# Patient Record
Sex: MALE | Race: ASIAN | NOT HISPANIC OR LATINO | Employment: FULL TIME | URBAN - NONMETROPOLITAN AREA
[De-identification: names, ages, dates, MRNs, and addresses within clinical notes are randomized per-mention and may not be internally consistent; named-entity substitution may affect disease eponyms.]

---

## 2024-05-08 ENCOUNTER — OFFICE VISIT (OUTPATIENT)
Dept: FAMILY MEDICINE CLINIC | Facility: CLINIC | Age: 44
End: 2024-05-08
Payer: COMMERCIAL

## 2024-05-08 VITALS
HEART RATE: 102 BPM | WEIGHT: 150.4 LBS | BODY MASS INDEX: 22.28 KG/M2 | OXYGEN SATURATION: 98 % | HEIGHT: 69 IN | SYSTOLIC BLOOD PRESSURE: 116 MMHG | DIASTOLIC BLOOD PRESSURE: 84 MMHG

## 2024-05-08 DIAGNOSIS — Z13.220 LIPID SCREENING: ICD-10-CM

## 2024-05-08 DIAGNOSIS — K59.00 CONSTIPATION, UNSPECIFIED CONSTIPATION TYPE: ICD-10-CM

## 2024-05-08 DIAGNOSIS — Z13.29 THYROID DISORDER SCREEN: ICD-10-CM

## 2024-05-08 DIAGNOSIS — Z00.00 ANNUAL PHYSICAL EXAM: Primary | ICD-10-CM

## 2024-05-08 DIAGNOSIS — Z13.1 SCREENING FOR DIABETES MELLITUS: ICD-10-CM

## 2024-05-08 PROCEDURE — 99386 PREV VISIT NEW AGE 40-64: CPT | Performed by: FAMILY MEDICINE

## 2024-05-08 RX ORDER — DIPHENOXYLATE HYDROCHLORIDE AND ATROPINE SULFATE 2.5; .025 MG/1; MG/1
1 TABLET ORAL DAILY
COMMUNITY

## 2024-05-10 ENCOUNTER — LAB (OUTPATIENT)
Dept: FAMILY MEDICINE CLINIC | Facility: CLINIC | Age: 44
End: 2024-05-10
Payer: COMMERCIAL

## 2024-05-10 DIAGNOSIS — K59.00 CONSTIPATION, UNSPECIFIED CONSTIPATION TYPE: ICD-10-CM

## 2024-05-10 DIAGNOSIS — Z00.00 ANNUAL PHYSICAL EXAM: Primary | ICD-10-CM

## 2024-05-10 DIAGNOSIS — Z13.1 SCREENING FOR DIABETES MELLITUS: ICD-10-CM

## 2024-05-10 DIAGNOSIS — Z13.29 THYROID DISORDER SCREEN: ICD-10-CM

## 2024-05-10 DIAGNOSIS — Z13.220 LIPID SCREENING: ICD-10-CM

## 2024-05-10 PROCEDURE — 36415 COLL VENOUS BLD VENIPUNCTURE: CPT | Performed by: FAMILY MEDICINE

## 2024-05-11 LAB
ALBUMIN SERPL-MCNC: 4.9 G/DL (ref 4.1–5.1)
ALBUMIN/GLOB SERPL: 1.8 {RATIO} (ref 1.2–2.2)
ALP SERPL-CCNC: 82 IU/L (ref 44–121)
ALT SERPL-CCNC: 35 IU/L (ref 0–44)
AST SERPL-CCNC: 23 IU/L (ref 0–40)
BASOPHILS # BLD AUTO: 0.1 X10E3/UL (ref 0–0.2)
BASOPHILS NFR BLD AUTO: 1 %
BILIRUB SERPL-MCNC: 0.5 MG/DL (ref 0–1.2)
BUN SERPL-MCNC: 5 MG/DL (ref 6–24)
BUN/CREAT SERPL: 6 (ref 9–20)
CALCIUM SERPL-MCNC: 9.6 MG/DL (ref 8.7–10.2)
CHLORIDE SERPL-SCNC: 98 MMOL/L (ref 96–106)
CHOLEST SERPL-MCNC: 212 MG/DL (ref 100–199)
CO2 SERPL-SCNC: 22 MMOL/L (ref 20–29)
CREAT SERPL-MCNC: 0.78 MG/DL (ref 0.76–1.27)
EGFRCR SERPLBLD CKD-EPI 2021: 113 ML/MIN/1.73
EOSINOPHIL # BLD AUTO: 0.1 X10E3/UL (ref 0–0.4)
EOSINOPHIL NFR BLD AUTO: 2 %
ERYTHROCYTE [DISTWIDTH] IN BLOOD BY AUTOMATED COUNT: 14 % (ref 11.6–15.4)
GLOBULIN SER CALC-MCNC: 2.7 G/DL (ref 1.5–4.5)
GLUCOSE SERPL-MCNC: 167 MG/DL (ref 70–99)
HBA1C MFR BLD: 7.5 % (ref 4.8–5.6)
HCT VFR BLD AUTO: 46.6 % (ref 37.5–51)
HDLC SERPL-MCNC: 44 MG/DL
HGB BLD-MCNC: 15.8 G/DL (ref 13–17.7)
IMM GRANULOCYTES # BLD AUTO: 0 X10E3/UL (ref 0–0.1)
IMM GRANULOCYTES NFR BLD AUTO: 1 %
LDLC SERPL CALC-MCNC: 107 MG/DL (ref 0–99)
LYMPHOCYTES # BLD AUTO: 2.4 X10E3/UL (ref 0.7–3.1)
LYMPHOCYTES NFR BLD AUTO: 41 %
MCH RBC QN AUTO: 28.6 PG (ref 26.6–33)
MCHC RBC AUTO-ENTMCNC: 33.9 G/DL (ref 31.5–35.7)
MCV RBC AUTO: 84 FL (ref 79–97)
MONOCYTES # BLD AUTO: 0.4 X10E3/UL (ref 0.1–0.9)
MONOCYTES NFR BLD AUTO: 6 %
NEUTROPHILS # BLD AUTO: 3 X10E3/UL (ref 1.4–7)
NEUTROPHILS NFR BLD AUTO: 49 %
PLATELET # BLD AUTO: 302 X10E3/UL (ref 150–450)
POTASSIUM SERPL-SCNC: 4.9 MMOL/L (ref 3.5–5.2)
PROT SERPL-MCNC: 7.6 G/DL (ref 6–8.5)
RBC # BLD AUTO: 5.53 X10E6/UL (ref 4.14–5.8)
SODIUM SERPL-SCNC: 137 MMOL/L (ref 134–144)
TRIGL SERPL-MCNC: 355 MG/DL (ref 0–149)
TSH SERPL DL<=0.005 MIU/L-ACNC: 1.28 UIU/ML (ref 0.45–4.5)
VLDLC SERPL CALC-MCNC: 61 MG/DL (ref 5–40)
WBC # BLD AUTO: 5.9 X10E3/UL (ref 3.4–10.8)

## 2025-02-28 ENCOUNTER — TELEPHONE (OUTPATIENT)
Dept: FAMILY MEDICINE CLINIC | Facility: CLINIC | Age: 45
End: 2025-02-28
Payer: COMMERCIAL

## 2025-02-28 NOTE — TELEPHONE ENCOUNTER
Attempted to reach pt LVM for pt to reschedule May 8th appt due to provider out of the office that day. Please reschedule pt

## 2025-03-12 ENCOUNTER — TELEPHONE (OUTPATIENT)
Dept: FAMILY MEDICINE CLINIC | Facility: CLINIC | Age: 45
End: 2025-03-12
Payer: COMMERCIAL

## 2025-03-12 NOTE — TELEPHONE ENCOUNTER
Attempted to reach pt LVM for pt to reschedule May 23RD appt due to provider out of the office that day. Please reschedule pt.

## 2025-07-09 ENCOUNTER — OFFICE VISIT (OUTPATIENT)
Dept: FAMILY MEDICINE CLINIC | Facility: CLINIC | Age: 45
End: 2025-07-09
Payer: COMMERCIAL

## 2025-07-09 VITALS
SYSTOLIC BLOOD PRESSURE: 112 MMHG | OXYGEN SATURATION: 98 % | HEART RATE: 82 BPM | BODY MASS INDEX: 19.89 KG/M2 | WEIGHT: 134.3 LBS | HEIGHT: 69 IN | DIASTOLIC BLOOD PRESSURE: 82 MMHG

## 2025-07-09 DIAGNOSIS — Z00.00 ANNUAL PHYSICAL EXAM: Primary | ICD-10-CM

## 2025-07-09 DIAGNOSIS — Z12.11 COLON CANCER SCREENING: ICD-10-CM

## 2025-07-09 DIAGNOSIS — Z11.59 NEED FOR HEPATITIS C SCREENING TEST: ICD-10-CM

## 2025-07-09 PROCEDURE — 99396 PREV VISIT EST AGE 40-64: CPT | Performed by: FAMILY MEDICINE

## 2025-07-09 NOTE — PROGRESS NOTES
Male Physical Note      Date: 2025   Patient Name: Lion Winter  : 1980   MRN: [id number]     Chief Complaint:    Chief Complaint   Patient presents with    Annual Exam     Pt presents for an annual physical exam. Pt has no significant med hx or daily medications at this time, aside from a daily multi-vitamin. Pt has been fasting for annual labs       History of Present Illness: Lion Winter is a 44 y.o. male who is here today for their annual health maintenance and physical.  No complaints today.  Patient would like to get labs.  He is interested in Cologuard but not colonoscopy.      Subjective      Review of Systems:   Review of Systems   All other systems reviewed and are negative.      Past Medical History, Social History, Family History and Care Team were all reviewed with patient and updated as appropriate.     Medications:     Current Outpatient Medications:     multivitamin (MULTIPLE VITAMIN PO), Take 1 tablet by mouth Daily., Disp: , Rfl:     Allergies:   No Known Allergies    Immunizations:  Health Maintenance Summary            Current Care Gaps       ANNUAL PHYSICAL (Yearly) Overdue since 2024  Registry Metric: Last Annual Physical                      Awaiting Completion       HEPATITIS C SCREENING (Once) Order placed this encounter      2025  Order placed for Hepatitis C Antibody by Braydon Schmid MD                      Upcoming       COVID-19 Vaccine (3 - 2024-25 season) Postponed until 2025  Postponed until 2026 by Hiwot Santiago MA (Product Unavailable)    2024  Postponed until 2024 by Sherita Baez CMA (Product Unavailable)    2021  Imm Admin: COVID-19 (PFIZER) Purple Cap Monovalent    2021  Imm Admin: COVID-19 (PFIZER) Purple Cap Monovalent              INFLUENZA VACCINE (Yearly - October to March) Next due on 10/1/2025     No completion, postpone, or frequency change  "history exists for this topic.              TDAP/TD VACCINES (2 - Td or Tdap) Next due on 9/8/2031 09/08/2021  Imm Admin: Tdap                      Completed or No Longer Recommended       Pneumococcal Vaccine 0-49 (Series Information) Aged Out     No completion, postpone, or frequency change history exists for this topic.                            No orders of the defined types were placed in this encounter.      Colorectal Screening:   Cologuard pending  Last Completed Colonoscopy    This patient has no relevant Health Maintenance data.         A1c:   Hemoglobin A1C   Date Value Ref Range Status   05/10/2024 7.5 (H) 4.8 - 5.6 % Final     Comment:              Prediabetes: 5.7 - 6.4           Diabetes: >6.4           Glycemic control for adults with diabetes: <7.0     Lipid panel: No results found for: \"LABLIPI\"    The 10-year ASCVD risk score (Emperatriz URENA, et al., 2019) is: 1.9%    Values used to calculate the score:      Age: 44 years      Sex: Male      Is Non- : No      Diabetic: No      Tobacco smoker: No      Systolic Blood Pressure: 112 mmHg      Is BP treated: No      HDL Cholesterol: 44 mg/dL      Total Cholesterol: 212 mg/dL      Tobacco Use: Low Risk  (7/9/2025)    Patient History     Smoking Tobacco Use: Never     Smokeless Tobacco Use: Never     Passive Exposure: Not on file       Social History     Substance and Sexual Activity   Alcohol Use Yes    Alcohol/week: 1.0 standard drink of alcohol    Types: 1 Cans of beer per week    Comment: Social Drinker        Social History     Substance and Sexual Activity   Drug Use Never           Objective     Physical Exam:  Vital Signs:   Vitals:    07/09/25 0818   BP: 112/82   BP Location: Right arm   Patient Position: Sitting   Cuff Size: Adult   Pulse: 82   SpO2: 98%   Weight: 60.9 kg (134 lb 4.8 oz)   Height: 175.3 cm (69\")     Facility age limit for growth %natalie is 20 years.  Body mass index is 19.83 kg/m².     Physical " Exam  Vitals and nursing note reviewed.   Constitutional:       Appearance: Normal appearance. He is not ill-appearing.   HENT:      Head: Normocephalic and atraumatic.      Right Ear: A middle ear effusion is present.      Left Ear: A middle ear effusion is present.      Nose: Mucosal edema present.   Cardiovascular:      Rate and Rhythm: Normal rate and regular rhythm.   Pulmonary:      Effort: Pulmonary effort is normal.      Breath sounds: Normal breath sounds.   Lymphadenopathy:      Cervical: No cervical adenopathy.   Neurological:      General: No focal deficit present.      Mental Status: He is alert.          POCT Results (if applicable):   Results for orders placed or performed in visit on 05/10/24   Hemoglobin A1c    Collection Time: 05/10/24 10:55 AM    Specimen: Arm, Left; Blood   Result Value Ref Range    Hemoglobin A1C 7.5 (H) 4.8 - 5.6 %   CBC Auto Differential    Collection Time: 05/10/24 10:55 AM    Specimen: Arm, Left; Blood   Result Value Ref Range    WBC 5.9 3.4 - 10.8 x10E3/uL    RBC 5.53 4.14 - 5.80 x10E6/uL    Hemoglobin 15.8 13.0 - 17.7 g/dL    Hematocrit 46.6 37.5 - 51.0 %    MCV 84 79 - 97 fL    MCH 28.6 26.6 - 33.0 pg    MCHC 33.9 31.5 - 35.7 g/dL    RDW 14.0 11.6 - 15.4 %    Platelets 302 150 - 450 x10E3/uL    Neutrophil Rel % 49 Not Estab. %    Lymphocyte Rel % 41 Not Estab. %    Monocyte Rel % 6 Not Estab. %    Eosinophil Rel % 2 Not Estab. %    Basophil Rel % 1 Not Estab. %    Neutrophils Absolute 3.0 1.4 - 7.0 x10E3/uL    Lymphocytes Absolute 2.4 0.7 - 3.1 x10E3/uL    Monocytes Absolute 0.4 0.1 - 0.9 x10E3/uL    Eosinophils Absolute 0.1 0.0 - 0.4 x10E3/uL    Basophils Absolute 0.1 0.0 - 0.2 x10E3/uL    Immature Granulocyte Rel % 1 Not Estab. %    Immature Grans Absolute 0.0 0.0 - 0.1 x10E3/uL   Comprehensive Metabolic Panel    Collection Time: 05/10/24 10:55 AM    Specimen: Arm, Left; Blood   Result Value Ref Range    Glucose 167 (H) 70 - 99 mg/dL    BUN 5 (L) 6 - 24 mg/dL     Creatinine 0.78 0.76 - 1.27 mg/dL    EGFR Result 113 >59 mL/min/1.73    BUN/Creatinine Ratio 6 (L) 9 - 20    Sodium 137 134 - 144 mmol/L    Potassium 4.9 3.5 - 5.2 mmol/L    Chloride 98 96 - 106 mmol/L    Total CO2 22 20 - 29 mmol/L    Calcium 9.6 8.7 - 10.2 mg/dL    Total Protein 7.6 6.0 - 8.5 g/dL    Albumin 4.9 4.1 - 5.1 g/dL    Globulin 2.7 1.5 - 4.5 g/dL    A/G Ratio 1.8 1.2 - 2.2    Total Bilirubin 0.5 0.0 - 1.2 mg/dL    Alkaline Phosphatase 82 44 - 121 IU/L    AST (SGOT) 23 0 - 40 IU/L    ALT (SGPT) 35 0 - 44 IU/L   Lipid Panel    Collection Time: 05/10/24 10:55 AM    Specimen: Arm, Left; Blood   Result Value Ref Range    Total Cholesterol 212 (H) 100 - 199 mg/dL    Triglycerides 355 (H) 0 - 149 mg/dL    HDL Cholesterol 44 >39 mg/dL    VLDL Cholesterol Lyndon 61 (H) 5 - 40 mg/dL    LDL Chol Calc (NIH) 107 (H) 0 - 99 mg/dL   TSH    Collection Time: 05/10/24 10:55 AM    Specimen: Arm, Left; Blood   Result Value Ref Range    TSH 1.280 0.450 - 4.500 uIU/mL       Procedures    Assessment / Plan      Assessment/Plan:   Assessment & Plan  Annual physical exam    Orders:    Cologuard - Stool, Per Rectum; Future    CBC & Differential    Comprehensive Metabolic Panel    Hemoglobin A1c    Lipid Panel    Colon cancer screening  Patient turns 45 in 3 weeks.  He declined colonoscopy but is agreeable to Cologuard.  Orders:    Cologuard - Stool, Per Rectum; Future    Need for hepatitis C screening test    Orders:    Hepatitis C Antibody         Healthcare Maintenance:  Counseling provided based on age appropriate USPSTF guidelines.  BMI is within normal parameters. No other follow-up for BMI required.    Lion Winter voices understanding and acceptance of this advice and will call back with any further questions or concerns. AVS with preventive healthcare tips printed for patient.     Vaccine Counseling:      Follow Up:   No follow-ups on file.      Get Schmid MD  [facility]

## 2025-07-10 ENCOUNTER — RESULTS FOLLOW-UP (OUTPATIENT)
Dept: FAMILY MEDICINE CLINIC | Facility: CLINIC | Age: 45
End: 2025-07-10

## 2025-07-10 DIAGNOSIS — E11.9 TYPE 2 DIABETES MELLITUS WITHOUT COMPLICATION, WITHOUT LONG-TERM CURRENT USE OF INSULIN: Primary | ICD-10-CM

## 2025-07-10 LAB
ALBUMIN SERPL-MCNC: 4.9 G/DL (ref 3.5–5.2)
ALBUMIN/GLOB SERPL: 2.1 G/DL
ALP SERPL-CCNC: 81 U/L (ref 39–117)
ALT SERPL-CCNC: 24 U/L (ref 1–41)
AST SERPL-CCNC: 19 U/L (ref 1–40)
BASOPHILS # BLD AUTO: 0.05 10*3/MM3 (ref 0–0.2)
BASOPHILS NFR BLD AUTO: 1 % (ref 0–1.5)
BILIRUB SERPL-MCNC: 0.6 MG/DL (ref 0–1.2)
BUN SERPL-MCNC: 7 MG/DL (ref 6–20)
BUN/CREAT SERPL: 7.4 (ref 7–25)
CALCIUM SERPL-MCNC: 9.2 MG/DL (ref 8.6–10.5)
CHLORIDE SERPL-SCNC: 105 MMOL/L (ref 98–107)
CHOLEST SERPL-MCNC: 184 MG/DL (ref 0–200)
CO2 SERPL-SCNC: 23.6 MMOL/L (ref 22–29)
CREAT SERPL-MCNC: 0.94 MG/DL (ref 0.76–1.27)
EGFRCR SERPLBLD CKD-EPI 2021: 102.5 ML/MIN/1.73
EOSINOPHIL # BLD AUTO: 0.06 10*3/MM3 (ref 0–0.4)
EOSINOPHIL NFR BLD AUTO: 1.2 % (ref 0.3–6.2)
ERYTHROCYTE [DISTWIDTH] IN BLOOD BY AUTOMATED COUNT: 13.1 % (ref 12.3–15.4)
GLOBULIN SER CALC-MCNC: 2.3 GM/DL
GLUCOSE SERPL-MCNC: 119 MG/DL (ref 65–99)
HBA1C MFR BLD: 6.4 % (ref 4.8–5.6)
HCT VFR BLD AUTO: 47.7 % (ref 37.5–51)
HCV IGG SERPL QL IA: NON REACTIVE
HDLC SERPL-MCNC: 55 MG/DL (ref 40–60)
HGB BLD-MCNC: 15.8 G/DL (ref 13–17.7)
IMM GRANULOCYTES # BLD AUTO: 0.01 10*3/MM3 (ref 0–0.05)
IMM GRANULOCYTES NFR BLD AUTO: 0.2 % (ref 0–0.5)
LDLC SERPL CALC-MCNC: 113 MG/DL (ref 0–100)
LYMPHOCYTES # BLD AUTO: 2.04 10*3/MM3 (ref 0.7–3.1)
LYMPHOCYTES NFR BLD AUTO: 41.9 % (ref 19.6–45.3)
MCH RBC QN AUTO: 30.2 PG (ref 26.6–33)
MCHC RBC AUTO-ENTMCNC: 33.1 G/DL (ref 31.5–35.7)
MCV RBC AUTO: 91 FL (ref 79–97)
MONOCYTES # BLD AUTO: 0.27 10*3/MM3 (ref 0.1–0.9)
MONOCYTES NFR BLD AUTO: 5.5 % (ref 5–12)
NEUTROPHILS # BLD AUTO: 2.44 10*3/MM3 (ref 1.7–7)
NEUTROPHILS NFR BLD AUTO: 50.2 % (ref 42.7–76)
NRBC BLD AUTO-RTO: 0 /100 WBC (ref 0–0.2)
PLATELET # BLD AUTO: 257 10*3/MM3 (ref 140–450)
POTASSIUM SERPL-SCNC: 4.3 MMOL/L (ref 3.5–5.2)
PROT SERPL-MCNC: 7.2 G/DL (ref 6–8.5)
RBC # BLD AUTO: 5.24 10*6/MM3 (ref 4.14–5.8)
SODIUM SERPL-SCNC: 141 MMOL/L (ref 136–145)
TRIGL SERPL-MCNC: 88 MG/DL (ref 0–150)
VLDLC SERPL CALC-MCNC: 16 MG/DL (ref 5–40)
WBC # BLD AUTO: 4.87 10*3/MM3 (ref 3.4–10.8)